# Patient Record
Sex: MALE | Race: ASIAN | NOT HISPANIC OR LATINO | Employment: FULL TIME | URBAN - METROPOLITAN AREA
[De-identification: names, ages, dates, MRNs, and addresses within clinical notes are randomized per-mention and may not be internally consistent; named-entity substitution may affect disease eponyms.]

---

## 2024-03-22 ENCOUNTER — HOSPITAL ENCOUNTER (EMERGENCY)
Facility: MEDICAL CENTER | Age: 44
End: 2024-03-22
Attending: EMERGENCY MEDICINE
Payer: COMMERCIAL

## 2024-03-22 VITALS
OXYGEN SATURATION: 95 % | WEIGHT: 196.87 LBS | SYSTOLIC BLOOD PRESSURE: 129 MMHG | RESPIRATION RATE: 18 BRPM | HEIGHT: 68 IN | TEMPERATURE: 97.8 F | DIASTOLIC BLOOD PRESSURE: 87 MMHG | BODY MASS INDEX: 29.84 KG/M2 | HEART RATE: 68 BPM

## 2024-03-22 DIAGNOSIS — R22.0 FACIAL SWELLING: ICD-10-CM

## 2024-03-22 DIAGNOSIS — T78.40XA ALLERGIC REACTION, INITIAL ENCOUNTER: ICD-10-CM

## 2024-03-22 PROCEDURE — 99284 EMERGENCY DEPT VISIT MOD MDM: CPT

## 2024-03-22 PROCEDURE — 700111 HCHG RX REV CODE 636 W/ 250 OVERRIDE (IP): Mod: JZ | Performed by: EMERGENCY MEDICINE

## 2024-03-22 PROCEDURE — 96375 TX/PRO/DX INJ NEW DRUG ADDON: CPT

## 2024-03-22 PROCEDURE — 96374 THER/PROPH/DIAG INJ IV PUSH: CPT

## 2024-03-22 RX ORDER — DIPHENHYDRAMINE HYDROCHLORIDE 50 MG/ML
25 INJECTION INTRAMUSCULAR; INTRAVENOUS ONCE
Status: COMPLETED | OUTPATIENT
Start: 2024-03-22 | End: 2024-03-22

## 2024-03-22 RX ORDER — PREDNISONE 20 MG/1
20 TABLET ORAL DAILY
Qty: 5 TABLET | Refills: 0 | Status: SHIPPED | OUTPATIENT
Start: 2024-03-22 | End: 2024-03-27

## 2024-03-22 RX ORDER — DEXAMETHASONE SODIUM PHOSPHATE 4 MG/ML
8 INJECTION, SOLUTION INTRA-ARTICULAR; INTRALESIONAL; INTRAMUSCULAR; INTRAVENOUS; SOFT TISSUE ONCE
Status: COMPLETED | OUTPATIENT
Start: 2024-03-22 | End: 2024-03-22

## 2024-03-22 RX ADMIN — DEXAMETHASONE SODIUM PHOSPHATE 8 MG: 4 INJECTION INTRA-ARTICULAR; INTRALESIONAL; INTRAMUSCULAR; INTRAVENOUS; SOFT TISSUE at 03:02

## 2024-03-22 RX ADMIN — DIPHENHYDRAMINE HYDROCHLORIDE 25 MG: 50 INJECTION, SOLUTION INTRAMUSCULAR; INTRAVENOUS at 03:03

## 2024-03-22 RX ADMIN — FAMOTIDINE 20 MG: 10 INJECTION, SOLUTION INTRAVENOUS at 03:03

## 2024-03-22 NOTE — ED NOTES
Assumed care of pt, received bedside report from FANTASMA Worrell   Cardiac and spO2 monitor on, call light within reach.

## 2024-03-22 NOTE — ED NOTES
Patient bedside report given to FANTASMA Guevara . Pt AAO X 4 , respirations even and unlabored, on room air . Call light in reach, Oxygen safety measures in place and Fall risk interventions in place.

## 2024-03-22 NOTE — ED TRIAGE NOTES
"Chief Complaint   Patient presents with    Facial Swelling     Facial swelling, primarily R side started around 2000 last night after dinner, and has progressively worsened. Unk etiology, pt denies known allergies      BP (!) 152/104   Pulse 82   Temp 36 °C (96.8 °F)   Resp 18   Ht 1.727 m (5' 8\")   Wt 89.3 kg (196 lb 13.9 oz)   SpO2 98%   BMI 29.93 kg/m²     Pt here for above cc  Pt reports eating dinner  last night and started to feel tingling/swelling sensation on R side of face. R side of face, lip, and mouth began to swell and has progressively worsened  Denies known allergies, ate salmon, zucchini, eggplant, potatoes, puttanesca (italian sauce w/ anchovies, shantal, herbs) sauce- last night for dinner    Fairly new meds for gout and L hip osteoporosis- motrin, alendronate sodium, and colchicine     Pt has noted swelling to R side of face, lips, and mouth. -airway compromise or respiratory issues/trouble breathing.  Denies tooth issues  Pt has never been here before- visiting from out of town    Pt to [facility]bara, educated on rooming process       "

## 2024-03-22 NOTE — ED PROVIDER NOTES
"  ER Provider Note    Scribed for Kale Hernández Ii, M.d. by Josué Aleman. 3/22/2024  2:46 AM    Primary Care Provider: No primary care provider noted.    CHIEF COMPLAINT  Chief Complaint   Patient presents with    Facial Swelling     Facial swelling, primarily R side started around 2000 last night after dinner, and has progressively worsened. Unk etiology, pt denies known allergies      EXTERNAL RECORDS REVIEWED  Other None available for review.     HPI/ROS  LIMITATION TO HISTORY   Select: : None  OUTSIDE HISTORIAN(S):  None    Harshad Chester is a 43 y.o. male who presents to the ED for evaluation of facial swelling onset 2000 last night after dinner. He states he felt something tingling under his lip after dinner before going to bed. 12:30 AM started on the lower right lip progressing to the upper lip and across the lower lip. He reports he is from [place]. The patient reports no associated symptoms, but denies any pain, vomiting, diarrhea, difficulty breathing.  He reports taking colchicine and ibuprofen since Tuesday for gout which he states have resolved his symptoms. His last dose of ibuprofen was around 8 PM. He has been taking alendronate for osteoporosis for the last 2 weeks. He denies having any similar episodes of similar symptoms previously.     PAST MEDICAL HISTORY  Past Medical History:   Diagnosis Date    Gout     Osteoporosis     L Hip, takes alendronate sodium     SURGICAL HISTORY  History reviewed. No pertinent surgical history.    FAMILY HISTORY  History reviewed. No pertinent family history.    SOCIAL HISTORY   reports that he has never smoked. He has never used smokeless tobacco. He reports that he does not currently use alcohol. He reports that he does not use drugs.    CURRENT MEDICATIONS  Previous Medications    No medications on file     ALLERGIES  Patient has no known allergies.    PHYSICAL EXAM  BP (!) 152/104   Pulse 82   Temp 36 °C (96.8 °F)   Resp 18   Ht 1.727 m (5' 8\")  "  Wt 89.3 kg (196 lb 13.9 oz)   SpO2 98%   BMI 29.93 kg/m²   Physical Exam  Vitals and nursing note reviewed.   HENT:      Head: Normocephalic.      Nose: Nose normal. No congestion.      Mouth/Throat:      Mouth: Mucous membranes are moist.      Comments: Right sided facial swelling at cheeks, no tenderness, no rash. Swelling also at right upper lip and entire lower lip. Voice clear. No stridor. No tongue or posterior pharynx edema.   Eyes:      Extraocular Movements: Extraocular movements intact.      Pupils: Pupils are equal, round, and reactive to light.   Cardiovascular:      Rate and Rhythm: Normal rate and regular rhythm.   Pulmonary:      Effort: Pulmonary effort is normal.      Breath sounds: Normal breath sounds. No wheezing.   Abdominal:      General: There is no distension.      Tenderness: There is no abdominal tenderness.   Musculoskeletal:         General: Normal range of motion.   Skin:     General: Skin is warm.      Findings: No rash.   Neurological:      General: No focal deficit present.      Mental Status: He is alert.      COURSE & MEDICAL DECISION MAKING     ED Observation Status? No; Patient does not meet criteria for ED Observation.     INITIAL ASSESSMENT, COURSE AND PLAN  Care Narrative:   2:49 AM - Patient was seen and evaluated at bedside. Patient presents to the ED for progressively worsening facial swelling onset last night around 8 PM.  No airway compromise. Lungs clear. No GI symptoms. Not anaphylaxis (only 1 system involved). After my exam, I discussed with the patient the plan of care, which includes treating the patient with antihistamines and steroids for their symptoms and reevaluation following medication administration. Patient understands and verbalizes agreement to plan of care. Patient will be treated with Benadryl 25 mg IV, dexamethasone 8 mg injection, and Pepcid 20 mg injection. Likely allergic reaction. Unlikely infection since not painful, no dental abnormalities.  Could also be other form of angioedema such as hereditary or autoimmune angioedema (but less likely since no prior history of this).     4:22 AM - Patient was reevaluated at bedside. Significant improvement in swelling observed. The patient reports feeling his swelling has improved. Plan to continue to monitor for improvement. If he continues to improve, I plan for discharge with prescriptions for antihistamine and steroid medication.     5:48 AM  Doing well and continues to improve. Will discharge now. In addition to prednisone, I have also prescribed an epi-pen for him to use as needed. I have asked him to follow up with primary provider when he arrives back home in [place].    PROBLEM LIST  #facial swelling   -likely secondary to allergic reaction but source unknown   -continue prednisone, benadryl, and pepcid as directed in discharge instructions    DISPOSITION AND DISCUSSIONS  I have discussed management of the patient with the following physicians and ONEIDA's:  None    Discussion of management with other Q or appropriate source(s): None     Escalation of care considered, and ultimately not performed: acute inpatient care management, however at this time, the patient is most appropriate for outpatient management.    Barriers to care at this time, including but not limited to:  None known at this time .     Decision tools and prescription drugs considered including, but not limited to:  steroids, when to use epi-pen .    The patient will return for new or worsening symptoms and is stable at the time of discharge.      DISPOSITION:  Patient will be discharged home in stable condition.    FOLLOW UP:  No follow-up provider specified.    OUTPATIENT MEDICATIONS:  New Prescriptions    EPINEPHRINE 0.3 MG/0.3ML SOLUTION PREFILLED SYRINGE    If you develop sudden facial swelling, trouble breathing, inject the contents of the epipen into the thigh, hold for 3 seconds and release from thigh as needed for  anaphylaxis. Go to ER after using    PREDNISONE (DELTASONE) 20 MG TAB    Take 1 Tablet by mouth every day for 5 days.      FINAL DIANGOSIS  1. Facial swelling    2. Allergic reaction, initial encounter         Josué QUICK (Joelibe), am scribing for, and in the presence of, AMERICA Campos II.    Electronically signed by: Josué Aleman (Scribe), 3/22/2024    Kale QUICK II, M* personally performed the services described in this documentation, as scribed by Josué Aleman in my presence, and it is both accurate and complete.     The note accurately reflects work and decisions made by me.  Kale Hernández II, M.D.  3/22/2024  5:50 AM

## 2024-03-22 NOTE — ED NOTES
Patient resting in bed, respirations even and unlabored on room air. Will continue to monitor patient.

## 2024-03-22 NOTE — DISCHARGE INSTRUCTIONS
In addition to prescribed Prednisone please take the following over the counter medications:    -Benadryl 25mg every 6-8 hours for next 3 days  -Pepcid 20mg twice daily for next 3 days

## 2024-03-22 NOTE — ED NOTES
Pt discharged home, discharge and follow up care instructions given, paper prescriptions given to pt, pt verbalized understanding. IV removed, pt ambulated out of ED independently.

## 2024-11-03 ENCOUNTER — HOSPITAL ENCOUNTER (EMERGENCY)
Facility: MEDICAL CENTER | Age: 44
End: 2024-11-03
Attending: EMERGENCY MEDICINE
Payer: COMMERCIAL

## 2024-11-03 VITALS
TEMPERATURE: 98 F | BODY MASS INDEX: 29.57 KG/M2 | HEART RATE: 87 BPM | SYSTOLIC BLOOD PRESSURE: 157 MMHG | DIASTOLIC BLOOD PRESSURE: 102 MMHG | RESPIRATION RATE: 16 BRPM | HEIGHT: 68 IN | OXYGEN SATURATION: 97 % | WEIGHT: 195.11 LBS

## 2024-11-03 DIAGNOSIS — N48.29 FORESKIN INFLAMMATION: ICD-10-CM

## 2024-11-03 DIAGNOSIS — Z20.2 POSSIBLE EXPOSURE TO STD: ICD-10-CM

## 2024-11-03 PROCEDURE — 87491 CHLMYD TRACH DNA AMP PROBE: CPT

## 2024-11-03 PROCEDURE — 87389 HIV-1 AG W/HIV-1&-2 AB AG IA: CPT

## 2024-11-03 PROCEDURE — 99283 EMERGENCY DEPT VISIT LOW MDM: CPT

## 2024-11-03 PROCEDURE — 36415 COLL VENOUS BLD VENIPUNCTURE: CPT

## 2024-11-03 PROCEDURE — 87591 N.GONORRHOEAE DNA AMP PROB: CPT

## 2024-11-03 PROCEDURE — 86780 TREPONEMA PALLIDUM: CPT

## 2024-11-03 ASSESSMENT — PAIN DESCRIPTION - PAIN TYPE: TYPE: ACUTE PAIN

## 2024-11-04 LAB
C TRACH DNA SPEC QL NAA+PROBE: NEGATIVE
HIV 1+2 AB+HIV1 P24 AG SERPL QL IA: NORMAL
N GONORRHOEA DNA SPEC QL NAA+PROBE: NEGATIVE
SPECIMEN SOURCE: NORMAL
T PALLIDUM AB SER QL IA: NONREACTIVE

## 2024-11-04 NOTE — ED NOTES
Patient given discharge instructions, verbalizes understanding. Patient provided education to come to ER if symptoms worsen. Discharged in stable condition with wife, able to walk out with steady gait.

## 2024-11-04 NOTE — ED TRIAGE NOTES
"Chief Complaint   Patient presents with    Wound Check     Reports \"a cut on my foreskin\". Unsure how he got this cut. Denies current antibx but expresses concern for infection d/t redness. Denies known fevers.      Physical Exam  Pulmonary:      Effort: Pulmonary effort is normal.   Skin:     General: Skin is warm and dry.   Neurological:      Mental Status: He is alert.       BP (!) 176/112   Pulse 100   Temp 36.7 °C (98 °F) (Temporal)   Resp 18   Ht 1.727 m (5' 8\")   Wt 88.5 kg (195 lb 1.7 oz)   SpO2 93%   BMI 29.67 kg/m²     "

## 2024-11-04 NOTE — ED PROVIDER NOTES
"ED Provider Note    CHIEF COMPLAINT  Chief Complaint   Patient presents with    Wound Check     Reports \"a cut on my foreskin\". Unsure how he got this cut. Denies current antibx but expresses concern for infection d/t redness. Denies known fevers.        EXTERNAL RECORDS REVIEWED  Reviewed outpatient family medicine encounters    HPI/ROS  LIMITATION TO HISTORY   None  OUTSIDE HISTORIAN(S):  None    Harshad Chester is a 44 y.o. male who presents for evaluation of some irritation around his foreskin.  The patient does admit he was sexually active without protection recently.  He has some discomfort on the underside of his foreskin.  He is concerned about possible STDs.  He denies any rash to the palms or soles fevers chills dysuria or hematuria.  He is an otherwise healthy 44-year-old.  No report of any lesions around the base of the penis or painful rash.    PAST MEDICAL HISTORY   has a past medical history of Gout, Hypertension, and Osteoporosis.    SURGICAL HISTORY  patient denies any surgical history    FAMILY HISTORY  No family history on file.    SOCIAL HISTORY  Social History     Tobacco Use    Smoking status: Never    Smokeless tobacco: Never   Substance and Sexual Activity    Alcohol use: Yes     Comment: occ    Drug use: Never    Sexual activity: Not on file       CURRENT MEDICATIONS  Home Medications       Reviewed by Stephanie Menendez R.N. (Registered Nurse) on 11/03/24 at 1602  Med List Status: Not Addressed     Medication Last Dose Status   EPINEPHrine 0.3 MG/0.3ML Solution Prefilled Syringe  Active                    ALLERGIES  Allergies   Allergen Reactions    Other Food      \"Spice, red meat\".        PHYSICAL EXAM  VITAL SIGNS: BP (!) 157/102   Pulse 87   Temp 36.7 °C (98 °F) (Temporal)   Resp 16   Ht 1.727 m (5' 8\")   Wt 88.5 kg (195 lb 1.7 oz)   SpO2 97%   BMI 29.67 kg/m²    Pulse ox interpretation: I interpret this pulse ox as normal.  Constitutional: Alert and oriented x 3, no acute " distress  HEENT: Atraumatic normocephalic, pupils are equal round reactive to light extraocular movements are intact. The nares is clear, external ears are normal, mouth shows moist mucous membranes normal dentition for age  Neck: Supple, no JVD no tracheal deviation  Cardiovascular: Regular rate and rhythm no murmur rub or gallop 2+ pulses peripherally x4  Thorax & Lungs: No respiratory distress, no wheezes rales or rhonchi, No chest tenderness.   GI: Soft nontender nondistended positive bowel sounds, no peritoneal signs  Male genitourinary: Patient is uncircumcised.  I can retract the foreskin.  There is no evidence of any obvious chancre.  There is a small lesion on the underside of the penis that is not consistent with a primary chancre or vesicles to suggest herpes.  There is no evidence of phimosis or paraphimosis no lymphadenopathy  Skin: Warm dry no acute rash or lesion  Musculoskeletal: Moving all extremities with full range and 5 of 5 strength no acute  deformity  Neurologic: Cranial nerves III through XII are grossly intact no sensory deficit no cerebellar dysfunction   Psychiatric: Appropriate affect for situation at this time          EKG/LABS  HIV, syphilis and gonorrhea chlamydia are pending    COURSE & MEDICAL DECISION MAKING    ASSESSMENT, COURSE AND PLAN  Care Narrative:    This is a very pleasant 44-year-old gentleman who presents here with some foreskin irritation.  Further history taking reveals that with recent unprotected sex he is primarily concerned about possible STD testing.  He did not clinically have urethral discharge or any obvious chancre or vesicles to suggest urethritis, syphilis or HSV infection.  Out of an abundance of caution I did offer the patient STD testing including gonorrhea and chlamydia urine testing, HIV and syphilis testing.  Apparently these had to be sent downtown because of the lab does not run these on the weekend.  The patient already waited 2 hours and is eager to  go home.  I think that is reasonable.  I counseled him to follow-up on [facility] and if there are any abnormalities he can follow-up with his PCP or return here for additional testing and/or treatment          ADDITIONAL PROBLEMS MANAGED      DISPOSITION AND DISCUSSIONS  I have discussed management of the patient with the following physicians and ONEIDA's: None    Discussion of management with other QHP or appropriate source(s): None    Escalation of care considered, and ultimately not performed: None    Barriers to care at this time, including but not limited to: None.     Decision tools and prescription drugs considered including, but not limited to: None.    FINAL DIAGNOSIS  1. Foreskin inflammation    2. Possible exposure to STD         Electronically signed by: Kurtis Navarro M.D., 11/3/2024 7:46 PM

## 2024-11-05 ENCOUNTER — HOSPITAL ENCOUNTER (EMERGENCY)
Facility: MEDICAL CENTER | Age: 44
End: 2024-11-05
Attending: STUDENT IN AN ORGANIZED HEALTH CARE EDUCATION/TRAINING PROGRAM
Payer: COMMERCIAL

## 2024-11-05 VITALS
SYSTOLIC BLOOD PRESSURE: 160 MMHG | DIASTOLIC BLOOD PRESSURE: 115 MMHG | RESPIRATION RATE: 18 BRPM | TEMPERATURE: 98.4 F | HEART RATE: 87 BPM | BODY MASS INDEX: 29.6 KG/M2 | WEIGHT: 195.33 LBS | OXYGEN SATURATION: 93 % | HEIGHT: 68 IN

## 2024-11-05 DIAGNOSIS — N48.9 PENILE LESION: ICD-10-CM

## 2024-11-05 PROCEDURE — 99284 EMERGENCY DEPT VISIT MOD MDM: CPT

## 2024-11-05 PROCEDURE — 700102 HCHG RX REV CODE 250 W/ 637 OVERRIDE(OP): Performed by: STUDENT IN AN ORGANIZED HEALTH CARE EDUCATION/TRAINING PROGRAM

## 2024-11-05 PROCEDURE — A9270 NON-COVERED ITEM OR SERVICE: HCPCS | Performed by: STUDENT IN AN ORGANIZED HEALTH CARE EDUCATION/TRAINING PROGRAM

## 2024-11-05 PROCEDURE — 96372 THER/PROPH/DIAG INJ SC/IM: CPT

## 2024-11-05 PROCEDURE — 700111 HCHG RX REV CODE 636 W/ 250 OVERRIDE (IP): Mod: JZ | Performed by: STUDENT IN AN ORGANIZED HEALTH CARE EDUCATION/TRAINING PROGRAM

## 2024-11-05 RX ORDER — DOXYCYCLINE 100 MG/1
100 CAPSULE ORAL 2 TIMES DAILY
Qty: 14 CAPSULE | Refills: 0 | Status: ACTIVE | OUTPATIENT
Start: 2024-11-05 | End: 2024-11-12

## 2024-11-05 RX ORDER — DOXYCYCLINE 100 MG/1
100 TABLET ORAL ONCE
Status: COMPLETED | OUTPATIENT
Start: 2024-11-05 | End: 2024-11-05

## 2024-11-05 RX ADMIN — PENICILLIN G BENZATHINE 2.4 MILLION UNITS: 1200000 INJECTION, SUSPENSION INTRAMUSCULAR at 23:19

## 2024-11-05 RX ADMIN — DOXYCYCLINE 100 MG: 100 TABLET, FILM COATED ORAL at 23:19

## 2024-11-05 NOTE — DISCHARGE PLANNING
"Writer received a call from this Pt to follow up on his ED visit from this past Sunday. He stated that he thought the ED DR was going to refer him to a urologist but writer reviewed the chart and the notes, nothing about a referral was noted. Though Dr Navarro did note, \" I counseled him to follow-up on [facility] My[facility] and if there are any abnormalities he can follow-up with his PCP or return here for additional testing and/or treatment.\"    Writer advised Pt of this notation.  "

## 2024-11-06 NOTE — ED NOTES
Patient is stable for d/c at this time, anticipatory guidance provided, close follow-up encouraged, and ED return instructions have been detailed. Patient  agreeable to the disposition, and plan and discharged home in ambulatory state and good condition.    Rx education provided, pt verbalized understanding.

## 2024-11-06 NOTE — ED PROVIDER NOTES
CHIEF COMPLAINT  Chief Complaint   Patient presents with    Pelvic Pain     PT presents d/t sores on his foreskin x 5 days. PT seen here for same on Sunday, received negative STD test results, but continues to have painful sores on his foreskin.        LIMITATION TO HISTORY   Select: None    HPI    Harshad Chester is a 44 y.o. male who presents to the Emergency Department evaluation of painful sores on his penis and foreskin.  Patient stated he had unprotected sex recently afterwards he developed painful sores on his penis.  Did have a recent STD testing which was negative.    OUTSIDE HISTORIAN(S):  Select: None    EXTERNAL RECORDS REVIEWED  Select: Other reviewed RPR which was negative.      PAST MEDICAL HISTORY  Past Medical History:   Diagnosis Date    Gout     Hypertension     Osteoporosis     L Hip, takes alendronate sodium     .    SURGICAL HISTORY  History reviewed. No pertinent surgical history.      FAMILY HISTORY  History reviewed. No pertinent family history.       SOCIAL HISTORY  Social History     Socioeconomic History    Marital status:      Spouse name: Not on file    Number of children: Not on file    Years of education: Not on file    Highest education level: Not on file   Occupational History    Not on file   Tobacco Use    Smoking status: Never    Smokeless tobacco: Never   Substance and Sexual Activity    Alcohol use: Yes     Comment: occ    Drug use: Never    Sexual activity: Not on file   Other Topics Concern    Not on file   Social History Narrative    Not on file     Social Drivers of Health     Financial Resource Strain: Not on File (3/9/2021)    Received from [facility]    Financial Resource Strain     Financial Resource Strain: 0   Food Insecurity: Not on File (3/9/2021)    Received from [facility]    Food Insecurity     Food: 0   Transportation Needs: Not on File (3/9/2021)    Received from [facility]    Transportation Needs     Transportation: 0   Physical Activity: Not on  "File (3/9/2021)    Received from [facility]    Physical Activity     Physical Activity: 0   Stress: Not on File (3/9/2021)    Received from [facility]    Stress     Stress: 0   Social Connections: Not on File (3/9/2021)    Received from [facility]    Social Connections     Social Connections and Isolation: 0   Intimate Partner Violence: Not on file   Housing Stability: Not on File (3/9/2021)    Received from [facility]    Housing Stability     Housin         CURRENT MEDICATIONS  No current facility-administered medications on file prior to encounter.     Current Outpatient Medications on File Prior to Encounter   Medication Sig Dispense Refill    EPINEPHrine 0.3 MG/0.3ML Solution Prefilled Syringe If you develop sudden facial swelling, trouble breathing, inject the contents of the epipen into the thigh, hold for 3 seconds and release from thigh as needed for anaphylaxis. Go to ER after using 1 Each 0           ALLERGIES  Allergies   Allergen Reactions    Other Food      \"Spice, red meat\".        PHYSICAL EXAM  VITAL SIGNS:BP (!) 160/115   Pulse 87   Temp 36.9 °C (98.4 °F) (Temporal)   Resp 18   Ht 1.727 m (5' 8\")   Wt 88.6 kg (195 lb 5.2 oz)   SpO2 93%   BMI 29.70 kg/m²       VITALS - vital signs documented prior to this note have been reviewed and noted,  see EHR  GENERAL - awake and alert, no acute distress  HEENT - normocephalic, atraumatic, moist mucus membranes  CARDIOVASCULAR - regular rate and rhythm  PULMONARY - unlabored, no respiratory distress. No audible wheezing or  strido   :.  Uncircumcised male, after retracting the foreskin, does have 2 cankers lesions on the  shaft of the penis which are tender to palpation he has tender bilateral inguinal lymphadenopathy  NEUROLOGIC - mental status normal, speech fluid, cognition normal  MUSCULOSKELETAL -no obvious deformity or swelling  DERMATOLOGIC - warm and dry, no visible rashes  PSYCHIATRIC - normal affect, normal " concentration      DIAGNOSTIC STUDIES / PROCEDURES        Radiologist interpretation:   No orders to display        COURSE & MEDICAL DECISION MAKING    ED COURSE:    ED Observation Status? no    INTERVENTIONS BY ME:  Medications   penicillin G benzathine (Bicillin-LA) injection 2.4 Million Units (2.4 Million Units Intramuscular Given 11/5/24 2319)   doxycycline monohydrate (Adoxa) tablet 100 mg (100 mg Oral Given 11/5/24 2319)                   INITIAL ASSESSMENT, COURSE AND PLAN  Care Narrative: Patient presented for evaluation of painful penile lesions.  On examination he has painful cankers lesions on the shaft of his penis as well as a tender anterior lymphadenopathy.  Was seen a few days ago for similar complaint and there was no noted lesions on his penis at that time his STD testing was also negative.  Perhaps this represents an early syphilis given that he had no noted chancroid's initially and his RPR was negative as such will treat with penicillin.  Also possible this represents an atypical sexually transmitted infection such as h ducreyi thus will elect to cover the patient with oral doxycycline as well.  He will be instructed to have his partner tested and return with worsening or persistent symptoms.  Patient felt comfortable with plan was discharged in stable condition.             ADDITIONAL PROBLEM LIST    DISPOSITION AND DISCUSSIONS    Barriers to care at this time, including but not limited to: Patient does not have established PCP.     Decision tools and prescription drugs considered including, but not limited to: Antibiotics patient will be discharged on doxycycline .    FINAL DIAGNOSIS  1. Penile lesion             Electronically signed by: Bala Marx DO ,11:34 PM 11/05/24

## 2024-11-06 NOTE — ED TRIAGE NOTES
"Chief Complaint   Patient presents with    Pelvic Pain     PT presents d/t sores on his foreskin x 5 days. PT seen here for same on Sunday, received negative STD test results, but continues to have painful sores on his foreskin.      BP (!) 160/115   Pulse 87   Temp 36.9 °C (98.4 °F) (Temporal)   Resp 18   Ht 1.727 m (5' 8\")   Wt 88.6 kg (195 lb 5.2 oz)   SpO2 93%   BMI 29.70 kg/m²     "

## 2024-11-07 ENCOUNTER — APPOINTMENT (OUTPATIENT)
Dept: MEDICAL GROUP | Age: 44
End: 2024-11-07
Payer: COMMERCIAL

## 2025-03-18 SDOH — HEALTH STABILITY: PHYSICAL HEALTH: ON AVERAGE, HOW MANY DAYS PER WEEK DO YOU ENGAGE IN MODERATE TO STRENUOUS EXERCISE (LIKE A BRISK WALK)?: 7 DAYS

## 2025-03-18 SDOH — ECONOMIC STABILITY: FOOD INSECURITY: WITHIN THE PAST 12 MONTHS, THE FOOD YOU BOUGHT JUST DIDN'T LAST AND YOU DIDN'T HAVE MONEY TO GET MORE.: NEVER TRUE

## 2025-03-18 SDOH — ECONOMIC STABILITY: INCOME INSECURITY: HOW HARD IS IT FOR YOU TO PAY FOR THE VERY BASICS LIKE FOOD, HOUSING, MEDICAL CARE, AND HEATING?: NOT HARD AT ALL

## 2025-03-18 SDOH — ECONOMIC STABILITY: INCOME INSECURITY: IN THE LAST 12 MONTHS, WAS THERE A TIME WHEN YOU WERE NOT ABLE TO PAY THE MORTGAGE OR RENT ON TIME?: NO

## 2025-03-18 SDOH — HEALTH STABILITY: PHYSICAL HEALTH: ON AVERAGE, HOW MANY MINUTES DO YOU ENGAGE IN EXERCISE AT THIS LEVEL?: 60 MIN

## 2025-03-18 SDOH — HEALTH STABILITY: MENTAL HEALTH
STRESS IS WHEN SOMEONE FEELS TENSE, NERVOUS, ANXIOUS, OR CAN'T SLEEP AT NIGHT BECAUSE THEIR MIND IS TROUBLED. HOW STRESSED ARE YOU?: NOT AT ALL

## 2025-03-18 SDOH — ECONOMIC STABILITY: TRANSPORTATION INSECURITY
IN THE PAST 12 MONTHS, HAS LACK OF RELIABLE TRANSPORTATION KEPT YOU FROM MEDICAL APPOINTMENTS, MEETINGS, WORK OR FROM GETTING THINGS NEEDED FOR DAILY LIVING?: NO

## 2025-03-18 SDOH — ECONOMIC STABILITY: FOOD INSECURITY: WITHIN THE PAST 12 MONTHS, YOU WORRIED THAT YOUR FOOD WOULD RUN OUT BEFORE YOU GOT MONEY TO BUY MORE.: NEVER TRUE

## 2025-03-18 SDOH — ECONOMIC STABILITY: TRANSPORTATION INSECURITY
IN THE PAST 12 MONTHS, HAS THE LACK OF TRANSPORTATION KEPT YOU FROM MEDICAL APPOINTMENTS OR FROM GETTING MEDICATIONS?: NO

## 2025-03-18 SDOH — ECONOMIC STABILITY: TRANSPORTATION INSECURITY
IN THE PAST 12 MONTHS, HAS LACK OF TRANSPORTATION KEPT YOU FROM MEETINGS, WORK, OR FROM GETTING THINGS NEEDED FOR DAILY LIVING?: NO

## 2025-03-18 SDOH — ECONOMIC STABILITY: HOUSING INSECURITY
IN THE LAST 12 MONTHS, WAS THERE A TIME WHEN YOU DID NOT HAVE A STEADY PLACE TO SLEEP OR SLEPT IN A SHELTER (INCLUDING NOW)?: NO

## 2025-03-18 ASSESSMENT — SOCIAL DETERMINANTS OF HEALTH (SDOH)
HOW OFTEN DO YOU GET TOGETHER WITH FRIENDS OR RELATIVES?: ONCE A WEEK
WITHIN THE PAST 12 MONTHS, YOU WORRIED THAT YOUR FOOD WOULD RUN OUT BEFORE YOU GOT THE MONEY TO BUY MORE: NEVER TRUE
HOW OFTEN DO YOU HAVE A DRINK CONTAINING ALCOHOL: 2-4 TIMES A MONTH
IN THE PAST 12 MONTHS, HAS THE ELECTRIC, GAS, OIL, OR WATER COMPANY THREATENED TO SHUT OFF SERVICE IN YOUR HOME?: NO
DO YOU BELONG TO ANY CLUBS OR ORGANIZATIONS SUCH AS CHURCH GROUPS UNIONS, FRATERNAL OR ATHLETIC GROUPS, OR SCHOOL GROUPS?: NO
IN A TYPICAL WEEK, HOW MANY TIMES DO YOU TALK ON THE PHONE WITH FAMILY, FRIENDS, OR NEIGHBORS?: MORE THAN THREE TIMES A WEEK
DO YOU BELONG TO ANY CLUBS OR ORGANIZATIONS SUCH AS CHURCH GROUPS UNIONS, FRATERNAL OR ATHLETIC GROUPS, OR SCHOOL GROUPS?: NO
HOW HARD IS IT FOR YOU TO PAY FOR THE VERY BASICS LIKE FOOD, HOUSING, MEDICAL CARE, AND HEATING?: NOT HARD AT ALL
HOW OFTEN DO YOU ATTENT MEETINGS OF THE CLUB OR ORGANIZATION YOU BELONG TO?: MORE THAN 4 TIMES PER YEAR
HOW OFTEN DO YOU GET TOGETHER WITH FRIENDS OR RELATIVES?: ONCE A WEEK
HOW OFTEN DO YOU ATTEND CHURCH OR RELIGIOUS SERVICES?: 1 TO 4 TIMES PER YEAR
IN A TYPICAL WEEK, HOW MANY TIMES DO YOU TALK ON THE PHONE WITH FAMILY, FRIENDS, OR NEIGHBORS?: MORE THAN THREE TIMES A WEEK
HOW OFTEN DO YOU HAVE SIX OR MORE DRINKS ON ONE OCCASION: NEVER
HOW OFTEN DO YOU ATTEND CHURCH OR RELIGIOUS SERVICES?: 1 TO 4 TIMES PER YEAR
HOW OFTEN DO YOU ATTENT MEETINGS OF THE CLUB OR ORGANIZATION YOU BELONG TO?: MORE THAN 4 TIMES PER YEAR
HOW MANY DRINKS CONTAINING ALCOHOL DO YOU HAVE ON A TYPICAL DAY WHEN YOU ARE DRINKING: 1 OR 2

## 2025-03-18 ASSESSMENT — LIFESTYLE VARIABLES
SKIP TO QUESTIONS 9-10: 1
HOW OFTEN DO YOU HAVE A DRINK CONTAINING ALCOHOL: 2-4 TIMES A MONTH
AUDIT-C TOTAL SCORE: 2
HOW OFTEN DO YOU HAVE SIX OR MORE DRINKS ON ONE OCCASION: NEVER
HOW MANY STANDARD DRINKS CONTAINING ALCOHOL DO YOU HAVE ON A TYPICAL DAY: 1 OR 2

## 2025-03-19 ENCOUNTER — APPOINTMENT (OUTPATIENT)
Dept: MEDICAL GROUP | Facility: PHYSICIAN GROUP | Age: 45
End: 2025-03-19
Payer: COMMERCIAL

## 2025-03-19 VITALS
WEIGHT: 198 LBS | HEART RATE: 86 BPM | TEMPERATURE: 98.1 F | SYSTOLIC BLOOD PRESSURE: 148 MMHG | BODY MASS INDEX: 30.01 KG/M2 | DIASTOLIC BLOOD PRESSURE: 74 MMHG | RESPIRATION RATE: 14 BRPM | OXYGEN SATURATION: 96 % | HEIGHT: 68 IN

## 2025-03-19 DIAGNOSIS — G89.29 CHRONIC RIGHT SHOULDER PAIN: ICD-10-CM

## 2025-03-19 DIAGNOSIS — M25.511 CHRONIC RIGHT SHOULDER PAIN: ICD-10-CM

## 2025-03-19 DIAGNOSIS — Z12.11 SCREENING FOR COLON CANCER: ICD-10-CM

## 2025-03-19 DIAGNOSIS — I10 PRIMARY HYPERTENSION: ICD-10-CM

## 2025-03-19 DIAGNOSIS — M10.9 ACUTE GOUT OF FOOT, UNSPECIFIED CAUSE, UNSPECIFIED LATERALITY: ICD-10-CM

## 2025-03-19 DIAGNOSIS — Z00.00 PHYSICAL EXAM, ANNUAL: ICD-10-CM

## 2025-03-19 DIAGNOSIS — T78.3XXD ANGIOEDEMA, SUBSEQUENT ENCOUNTER: ICD-10-CM

## 2025-03-19 DIAGNOSIS — Z11.3 SCREEN FOR STD (SEXUALLY TRANSMITTED DISEASE): ICD-10-CM

## 2025-03-19 PROCEDURE — 3077F SYST BP >= 140 MM HG: CPT | Performed by: PHYSICIAN ASSISTANT

## 2025-03-19 PROCEDURE — 3078F DIAST BP <80 MM HG: CPT | Performed by: PHYSICIAN ASSISTANT

## 2025-03-19 PROCEDURE — 99204 OFFICE O/P NEW MOD 45 MIN: CPT | Performed by: PHYSICIAN ASSISTANT

## 2025-03-19 RX ORDER — COLCHICINE 0.6 MG/1
TABLET ORAL
Qty: 3 TABLET | Refills: 3 | Status: SHIPPED | OUTPATIENT
Start: 2025-03-19

## 2025-03-19 RX ORDER — LOSARTAN POTASSIUM 25 MG/1
25 TABLET ORAL DAILY
Qty: 100 TABLET | Refills: 3 | Status: SHIPPED | OUTPATIENT
Start: 2025-03-19 | End: 2026-04-23

## 2025-03-19 ASSESSMENT — PATIENT HEALTH QUESTIONNAIRE - PHQ9: CLINICAL INTERPRETATION OF PHQ2 SCORE: 0

## 2025-03-19 NOTE — PROGRESS NOTES
CC:    Chief Complaint   Patient presents with    [facility]     Gout, hypertension concerns        HISTORY OF THE PRESENT ILLNESS: Patient is a 44 y.o. male presenting to establish primary care     Pt concerned about his BP. Has had high BP readings in the past.   Has hx of gout. Had 4 flares in two years. Most recent was about a year ago and since then has been modifying his diet.   Pt has hx of L hip pain. Diagnosed by ortho with osteoporosis and treated with Fosamax. He went non weight bearing for several months.   Pt has hx of lip swelling from allergic reaction. Seen about a year ago in ER for this and prescribed epipen. Has happened several times since then but has not been able to identify trigger.   Pt having R foot pain for past year. Worse in morning when he gets up.   Pt has keloid over his chest from ingrown hair removal.     No problem-specific Assessment & Plan notes found for this encounter.    Allergies: Other food    Current Outpatient Medications Ordered in Epic   Medication Sig Dispense Refill    EPINEPHrine 0.3 MG/0.3ML Solution Prefilled Syringe If you develop sudden facial swelling, trouble breathing, inject the contents of the epipen into the thigh, hold for 3 seconds and release from thigh as needed for anaphylaxis. Go to ER after using 1 Each 0     No current [facility]-ordered facility-administered medications on file.       Past Medical History:   Diagnosis Date    Gout     Hypertension     Osteoporosis     L Hip, takes alendronate sodium       No past surgical history on file.    Social History     Tobacco Use    Smoking status: Never    Smokeless tobacco: Never   Vaping Use    Vaping status: Never Used   Substance Use Topics    Alcohol use: Yes     Comment: occ    Drug use: Never       Social History     Social History Narrative    Not on file       No family history on file.    ROS:     - Constitutional: Negative for fever, chills, unexpected weight change, and fatigue/generalized  "weakness.        .      Exam: BP (!) 148/74   Pulse 86   Temp 36.7 °C (98.1 °F) (Temporal)   Resp 14   Ht 1.727 m (5' 8\")   Wt 89.8 kg (198 lb)   SpO2 96%  Body mass index is 30.11 kg/m².    General: Normal appearing. No acute distress.  Skin: Warm and dry.  Positive for keloid scar over chest  HEENT: Normocephalic. Eyes conjunctiva clear lids without ptosis, ears normal shape and contour  Cardiovascular: Regular rate and rhythm without murmur.   Respiratory: Clear to auscultation bilaterally, no rhonchi wheezing or rales.  Neurologic: Grossly nonfocal, A&O x3, gait normal,  Musculoskeletal: No deformity or swelling.   Extremities: No extremity cyanosis, clubbing, or edema.  Psych: Normal mood and affect. Judgment and insight is normal.    Please note that this dictation was created using voice recognition software. I have made every reasonable attempt to correct obvious errors, but I expect that there are errors of grammar and possibly content that I did not discover before finalizing the note.      Assessment/Plan    1. Acute gout of foot, unspecified cause, unspecified laterality  Take colchicine PRN for gout flares.   - colchicine (COLCRYS) 0.6 MG Tab; Take two tablets by mouth as needed for gout flare, then take one tablet one hour later  Dispense: 3 Tablet; Refill: 3    2. Angioedema, subsequent encounter  Referral placed.   - Referral to Allergy    3. Screening for colon cancer    - Referral to GI for Colonoscopy    4. Physical exam, annual  Labs printed  - CBC WITH DIFFERENTIAL; Future  - HEMOGLOBIN A1C; Future  - Comp Metabolic Panel; Future  - Lipid Profile; Future  - TSH WITH REFLEX TO FT4; Future  - VITAMIN D,25 HYDROXY (DEFICIENCY); Future  - CT-CARDIAC SCORING; Future    5. Primary hypertension  Start on losartan 25mg  - losartan (COZAAR) 25 MG Tab; Take 1 Tablet by mouth every day.  Dispense: 100 Tablet; Refill: 3    6. Screen for STD (sexually transmitted disease)    - Chlamydia/GC, PCR " (Urine); Future  - HEP B CORE AB TEST,TOTAL; Future  - T.PALLIDUM AB FRANKY (SCREENING); Future  - HIV AG/AB COMBO ASSAY SCREENING; Future    7. Chronic right shoulder pain  Will get ortho consult  - Referral to Orthopedics

## 2025-03-20 ENCOUNTER — HOSPITAL ENCOUNTER (OUTPATIENT)
Facility: MEDICAL CENTER | Age: 45
End: 2025-03-20
Attending: PHYSICIAN ASSISTANT
Payer: COMMERCIAL

## 2025-03-20 DIAGNOSIS — Z00.00 PHYSICAL EXAM, ANNUAL: ICD-10-CM

## 2025-03-20 DIAGNOSIS — Z11.3 SCREEN FOR STD (SEXUALLY TRANSMITTED DISEASE): ICD-10-CM

## 2025-03-20 LAB
25(OH)D3 SERPL-MCNC: 26 NG/ML (ref 30–100)
ALBUMIN SERPL BCP-MCNC: 4.1 G/DL (ref 3.2–4.9)
ALBUMIN/GLOB SERPL: 1.2 G/DL
ALP SERPL-CCNC: 118 U/L (ref 30–99)
ALT SERPL-CCNC: 36 U/L (ref 2–50)
ANION GAP SERPL CALC-SCNC: 11 MMOL/L (ref 7–16)
AST SERPL-CCNC: 38 U/L (ref 12–45)
BASOPHILS # BLD AUTO: 0.3 % (ref 0–1.8)
BASOPHILS # BLD: 0.03 K/UL (ref 0–0.12)
BILIRUB SERPL-MCNC: 0.5 MG/DL (ref 0.1–1.5)
BUN SERPL-MCNC: 16 MG/DL (ref 8–22)
CALCIUM ALBUM COR SERPL-MCNC: 9.2 MG/DL (ref 8.5–10.5)
CALCIUM SERPL-MCNC: 9.3 MG/DL (ref 8.4–10.2)
CHLORIDE SERPL-SCNC: 108 MMOL/L (ref 96–112)
CHOLEST SERPL-MCNC: 163 MG/DL (ref 100–199)
CO2 SERPL-SCNC: 22 MMOL/L (ref 20–33)
CREAT SERPL-MCNC: 1.09 MG/DL (ref 0.5–1.4)
EOSINOPHIL # BLD AUTO: 0.36 K/UL (ref 0–0.51)
EOSINOPHIL NFR BLD: 3.2 % (ref 0–6.9)
ERYTHROCYTE [DISTWIDTH] IN BLOOD BY AUTOMATED COUNT: 46.5 FL (ref 35.9–50)
EST. AVERAGE GLUCOSE BLD GHB EST-MCNC: 126 MG/DL
FASTING STATUS PATIENT QL REPORTED: NORMAL
GFR SERPLBLD CREATININE-BSD FMLA CKD-EPI: 85 ML/MIN/1.73 M 2
GLOBULIN SER CALC-MCNC: 3.3 G/DL (ref 1.9–3.5)
GLUCOSE SERPL-MCNC: 100 MG/DL (ref 65–99)
HBA1C MFR BLD: 6 % (ref 4–5.6)
HBV CORE AB SERPL QL IA: NONREACTIVE
HCT VFR BLD AUTO: 45.7 % (ref 42–52)
HDLC SERPL-MCNC: 52 MG/DL
HGB BLD-MCNC: 14.8 G/DL (ref 14–18)
HIV 1+2 AB+HIV1 P24 AG SERPL QL IA: NORMAL
IMM GRANULOCYTES # BLD AUTO: 0.04 K/UL (ref 0–0.11)
IMM GRANULOCYTES NFR BLD AUTO: 0.4 % (ref 0–0.9)
LDLC SERPL CALC-MCNC: 101 MG/DL
LYMPHOCYTES # BLD AUTO: 2.39 K/UL (ref 1–4.8)
LYMPHOCYTES NFR BLD: 21.5 % (ref 22–41)
MCH RBC QN AUTO: 29.1 PG (ref 27–33)
MCHC RBC AUTO-ENTMCNC: 32.4 G/DL (ref 32.3–36.5)
MCV RBC AUTO: 89.8 FL (ref 81.4–97.8)
MONOCYTES # BLD AUTO: 0.76 K/UL (ref 0–0.85)
MONOCYTES NFR BLD AUTO: 6.8 % (ref 0–13.4)
NEUTROPHILS # BLD AUTO: 7.55 K/UL (ref 1.82–7.42)
NEUTROPHILS NFR BLD: 67.8 % (ref 44–72)
NRBC # BLD AUTO: 0 K/UL
NRBC BLD-RTO: 0 /100 WBC (ref 0–0.2)
PLATELET # BLD AUTO: 183 K/UL (ref 164–446)
PMV BLD AUTO: 12.3 FL (ref 9–12.9)
POTASSIUM SERPL-SCNC: 4.2 MMOL/L (ref 3.6–5.5)
PROT SERPL-MCNC: 7.4 G/DL (ref 6–8.2)
RBC # BLD AUTO: 5.09 M/UL (ref 4.7–6.1)
SODIUM SERPL-SCNC: 141 MMOL/L (ref 135–145)
T PALLIDUM AB SER QL IA: NORMAL
TRIGL SERPL-MCNC: 51 MG/DL (ref 0–149)
TSH SERPL DL<=0.005 MIU/L-ACNC: 2.03 UIU/ML (ref 0.38–5.33)
WBC # BLD AUTO: 11.1 K/UL (ref 4.8–10.8)

## 2025-03-20 PROCEDURE — 87491 CHLMYD TRACH DNA AMP PROBE: CPT

## 2025-03-20 PROCEDURE — 80061 LIPID PANEL: CPT

## 2025-03-20 PROCEDURE — 84443 ASSAY THYROID STIM HORMONE: CPT

## 2025-03-20 PROCEDURE — 87591 N.GONORRHOEAE DNA AMP PROB: CPT

## 2025-03-20 PROCEDURE — 86704 HEP B CORE ANTIBODY TOTAL: CPT

## 2025-03-20 PROCEDURE — 36415 COLL VENOUS BLD VENIPUNCTURE: CPT

## 2025-03-20 PROCEDURE — 82306 VITAMIN D 25 HYDROXY: CPT

## 2025-03-20 PROCEDURE — 87389 HIV-1 AG W/HIV-1&-2 AB AG IA: CPT

## 2025-03-20 PROCEDURE — 86780 TREPONEMA PALLIDUM: CPT

## 2025-03-20 PROCEDURE — 80053 COMPREHEN METABOLIC PANEL: CPT

## 2025-03-20 PROCEDURE — 85025 COMPLETE CBC W/AUTO DIFF WBC: CPT

## 2025-03-20 PROCEDURE — 83036 HEMOGLOBIN GLYCOSYLATED A1C: CPT

## 2025-03-21 ENCOUNTER — HOSPITAL ENCOUNTER (OUTPATIENT)
Dept: RADIOLOGY | Facility: MEDICAL CENTER | Age: 45
End: 2025-03-21
Attending: PHYSICIAN ASSISTANT
Payer: COMMERCIAL

## 2025-03-21 DIAGNOSIS — Z00.00 PHYSICAL EXAM, ANNUAL: ICD-10-CM

## 2025-03-21 LAB
C TRACH DNA SPEC QL NAA+PROBE: NEGATIVE
N GONORRHOEA DNA SPEC QL NAA+PROBE: NEGATIVE
SPECIMEN SOURCE: NORMAL

## 2025-03-21 PROCEDURE — 4410556 CT-CARDIAC SCORING (SELF PAY ONLY)

## 2025-03-25 NOTE — Clinical Note
REFERRAL APPROVAL NOTICE         Sent on March 25, 2025                   Harshad Chester  [address] [place] [address]                   Dear Mr. Chester,    After a careful review of the medical information and benefit coverage, [facility] has processed your referral. See below for additional details.    If applicable, you must be actively enrolled with your insurance for coverage of the authorized service. If you have any questions regarding your coverage, please contact your insurance directly.    REFERRAL INFORMATION   Referral #:  [id number]  Referred-To Service Location    Referred-By Provider:  Gastroenterology    Max Emery P.A.-C.   [facility]      [address] [facility]  [address]  [place] [id number] [phone] [address] [facility] [address]  [phone]    Referral Start Date:  03/19/2025  Referral End Date:   03/19/2026             SCHEDULING  If you do not already have an appointment, please call [phone] to make an appointment.     MORE INFORMATION  If you do not already have a [employer] account, sign up at: [email or web address]  You can access your medical information, make appointments, see lab results, billing information, and more.  If you have questions regarding this referral, please contact  the [facility] Referrals department at:             [phone]. Monday - Friday 8:00AM - 5:00PM.     Sincerely,    [facility]

## 2025-03-25 NOTE — Clinical Note
REFERRAL APPROVAL NOTICE         Sent on March 25, 2025                   Harshad Chester  [address] [facility] [address]                   Dear Mr. Chester,    After a careful review of the medical information and benefit coverage, [facility] has processed your referral. See below for additional details.    If applicable, you must be actively enrolled with your insurance for coverage of the authorized service. If you have any questions regarding your coverage, please contact your insurance directly.    REFERRAL INFORMATION   Referral #:  [id number]  Referred-To Department    Referred-By Provider:  Orthopedics    Max Emery P.A.-C.   [facility] ([facility])      [address] [id number] [phone] [address] [facility] [id number] [phone]    Referral Start Date:  03/19/2025  Referral End Date:   03/19/2026             SCHEDULING  If you do not already have an appointment, please call [phone] to make an appointment.     MORE INFORMATION  If you do not already have a [employer] account, sign up at: [email or web address]  You can access your medical information, make appointments, see lab results, billing information, and more.  If you have questions regarding this referral, please contact  the [facility] Referrals department at:             [phone]. Monday - Friday 8:00AM - 5:00PM.     Sincerely,    [facility]

## 2025-03-25 NOTE — Clinical Note
REFERRAL APPROVAL NOTICE         Sent on March 25, 2025                   Harshad Chester  [address]  [place] [address]                   Dear Mr. Chester,    After a careful review of the medical information and benefit coverage, [facility] has processed your referral. See below for additional details.    If applicable, you must be actively enrolled with your insurance for coverage of the authorized service. If you have any questions regarding your coverage, please contact your insurance directly.    REFERRAL INFORMATION   Referral #:  [id number]  Referred-To Service Location    Referred-By Provider:  Allergy and Immunology    Max Emery P.A.-C.   [facility]      [address] [facility] [address] [facility] [address]  [facility] [address]  [phone] [address]  [facility] [address]  [phone]    Referral Start Date:  03/19/2025  Referral End Date:   03/19/2026             SCHEDULING  If you do not already have an appointment, please call [phone] to make an appointment.     MORE INFORMATION  If you do not already have a [employer] account, sign up at: [email or web address]  You can access your medical information, make appointments, see lab results, billing information, and more.  If you have questions regarding this referral, please contact  the [facility] Referrals department at:             [phone]. Monday - Friday 8:00AM - 5:00PM.     Sincerely,    [facility]

## 2025-03-26 ENCOUNTER — RESULTS FOLLOW-UP (OUTPATIENT)
Dept: MEDICAL GROUP | Facility: PHYSICIAN GROUP | Age: 45
End: 2025-03-26

## 2025-07-07 DIAGNOSIS — M10.9 ACUTE GOUT OF FOOT, UNSPECIFIED CAUSE, UNSPECIFIED LATERALITY: ICD-10-CM

## 2025-07-07 RX ORDER — COLCHICINE 0.6 MG/1
TABLET ORAL
Qty: 3 TABLET | Refills: 3 | Status: SHIPPED | OUTPATIENT
Start: 2025-07-07 | End: 2025-07-12 | Stop reason: SDUPTHER

## 2025-07-12 ENCOUNTER — OFFICE VISIT (OUTPATIENT)
Dept: URGENT CARE | Facility: CLINIC | Age: 45
End: 2025-07-12
Payer: COMMERCIAL

## 2025-07-12 ENCOUNTER — PHARMACY VISIT (OUTPATIENT)
Dept: PHARMACY | Facility: MEDICAL CENTER | Age: 45
End: 2025-07-12
Payer: COMMERCIAL

## 2025-07-12 VITALS
TEMPERATURE: 98.1 F | HEIGHT: 68 IN | OXYGEN SATURATION: 97 % | SYSTOLIC BLOOD PRESSURE: 136 MMHG | RESPIRATION RATE: 18 BRPM | HEART RATE: 104 BPM | BODY MASS INDEX: 29.55 KG/M2 | DIASTOLIC BLOOD PRESSURE: 80 MMHG | WEIGHT: 195 LBS

## 2025-07-12 DIAGNOSIS — M10.9 ACUTE GOUT OF FOOT, UNSPECIFIED CAUSE, UNSPECIFIED LATERALITY: ICD-10-CM

## 2025-07-12 PROBLEM — E55.9 VITAMIN D DEFICIENCY: Status: ACTIVE | Noted: 2020-12-29

## 2025-07-12 PROCEDURE — RXMED WILLOW AMBULATORY MEDICATION CHARGE

## 2025-07-12 PROCEDURE — 99214 OFFICE O/P EST MOD 30 MIN: CPT

## 2025-07-12 RX ORDER — KETOROLAC TROMETHAMINE 15 MG/ML
15 INJECTION, SOLUTION INTRAMUSCULAR; INTRAVENOUS ONCE
Status: DISCONTINUED | OUTPATIENT
Start: 2025-07-12 | End: 2025-07-12

## 2025-07-12 RX ORDER — ALLOPURINOL 100 MG/1
100 TABLET ORAL DAILY
Qty: 30 TABLET | Refills: 0 | Status: SHIPPED | OUTPATIENT
Start: 2025-07-12

## 2025-07-12 RX ORDER — METHYLPREDNISOLONE 4 MG/1
TABLET ORAL
Qty: 21 TABLET | Refills: 0 | Status: SHIPPED | OUTPATIENT
Start: 2025-07-12 | End: 2025-07-21

## 2025-07-12 RX ORDER — COLCHICINE 0.6 MG/1
TABLET ORAL
Qty: 3 TABLET | Refills: 3 | Status: SHIPPED | OUTPATIENT
Start: 2025-07-12 | End: 2025-07-12

## 2025-07-12 RX ORDER — IBUPROFEN 800 MG/1
800 TABLET, FILM COATED ORAL EVERY 8 HOURS PRN
Qty: 30 TABLET | Refills: 0 | Status: SHIPPED | OUTPATIENT
Start: 2025-07-12 | End: 2025-07-21

## 2025-07-12 RX ORDER — COLCHICINE 0.6 MG/1
TABLET ORAL
Qty: 3 TABLET | Refills: 3 | Status: SHIPPED | OUTPATIENT
Start: 2025-07-12

## 2025-07-12 RX ORDER — KETOROLAC TROMETHAMINE 15 MG/ML
15 INJECTION, SOLUTION INTRAMUSCULAR; INTRAVENOUS ONCE
Status: COMPLETED | OUTPATIENT
Start: 2025-07-12 | End: 2025-07-12

## 2025-07-12 RX ADMIN — KETOROLAC TROMETHAMINE 15 MG: 15 INJECTION, SOLUTION INTRAMUSCULAR; INTRAVENOUS at 20:58

## 2025-07-12 ASSESSMENT — FIBROSIS 4 INDEX: FIB4 SCORE: 1.557377049180327869

## 2025-07-17 NOTE — PROGRESS NOTES
"Subjective:   Harshad Chester is a 44 y.o. male who presents for Leg Pain (Possible Gout flare up on left leg, on and off flare ups. )      HPI:    Patient presents to urgent care with concerns of gout flare  He reports left great toe and foot pain  Intermittent issues for a few weeks. Poor response to colchicine, dietary changes.  Has tried ice packs, ibuprofen  Reports indomethacin worked well the last time he had flare  He has PCP appointment next week.  Endorses history of gout and would like to start allopurinol  Has not taken allopurinol recently     ROS As above in HPI    Medications:    Medications Ordered Prior to Encounter[1]     Allergies:   Other food    Problem List:   Problem List[2]     Surgical History:  Past Surgical History:   Procedure Laterality Date    RETINAL DETACHMENT REPAIR Left        Past Social Hx:   Social History[3]       Problem list, medications, and allergies reviewed by myself today in Epic.     Objective:     /80   Pulse (!) 104   Temp 36.7 °C (98.1 °F) (Temporal)   Resp 18   Ht 1.727 m (5' 8\")   Wt 88.5 kg (195 lb)   SpO2 97%   BMI 29.65 kg/m²     Physical Exam  Vitals and nursing note reviewed.   Constitutional:       Appearance: Normal appearance.   Cardiovascular:      Rate and Rhythm: Normal rate and regular rhythm.      Heart sounds: Normal heart sounds.   Pulmonary:      Effort: Pulmonary effort is normal.      Breath sounds: Normal breath sounds.   Abdominal:      General: Bowel sounds are normal.      Palpations: Abdomen is soft.   Musculoskeletal:         General: Tenderness and signs of injury present. No swelling or deformity.      Left foot: Normal range of motion and normal capillary refill. Swelling, tenderness and bony tenderness (first MTP joint and midfoot) present. No deformity. Normal pulse.      Comments: First MTP joint and midfoot have erythema and warmth present   Skin:     Capillary Refill: Capillary refill takes less than 2 seconds.      " Findings: No erythema.   Neurological:      Mental Status: He is alert and oriented to person, place, and time.      Gait: Gait abnormal.         Assessment/Plan:       Diagnosis and associated orders:   1. Acute gout of foot, unspecified cause, unspecified laterality  - colchicine (COLCRYS) 0.6 MG Tab; Take two tablets by mouth as needed for gout flare, then take one tablet one hour later  Dispense: 3 Tablet; Refill: 3  - methylPREDNISolone (MEDROL DOSEPAK) 4 MG Tablet Therapy Pack; Follow schedule on package instructions.  Dispense: 21 Tablet; Refill: 0  - ibuprofen (MOTRIN) 800 MG Tab; Take 1 Tablet by mouth every 8 hours as needed for Inflammation or Moderate Pain.  Dispense: 30 Tablet; Refill: 0  - allopurinol (ZYLOPRIM) 100 MG Tab; Take 1 Tablet by mouth every day.  Dispense: 30 Tablet; Refill: 0  - ketorolac (Toradol) 15 MG/ML injection 15 mg       Comments/MDM:     Likely experiencing an acute gout flare and inadequate use of anti-inflammatories.   Toradol administered in office, patient to resume use of NSAIDs tomorrow.   Will refill colchicine and prednisone. Will resume use of allopurinol.   Encouraged rest, elevation, application of ice packs.  Follow up with PCP advised.       Return to clinic or go to ED if symptoms worsen or persist. Indications for ED discussed at length. Patient/Parent/Guardian voices understanding. Follow-up with your primary care provider in 3-5 days. Red flag symptoms discussed. All side effects of medication discussed including allergic response, GI upset, tendon injury, rash, sedation etc.    Please note that this dictation was created using voice recognition software. I have made a reasonable attempt to correct obvious errors, but I expect that there are errors of grammar and possibly content that I did not discover before finalizing the note.    This note was electronically signed by LINDSAY Harley         [1]   Current Outpatient Medications on File Prior to  Visit   Medication Sig Dispense Refill    losartan (COZAAR) 25 MG Tab Take 1 Tablet by mouth every day. (Patient not taking: Reported on 7/12/2025) 100 Tablet 3     No current facility-administered medications on file prior to visit.   [2]   Patient Active Problem List  Diagnosis    Acute gout involving toe of right foot    Vitamin D deficiency   [3]   Social History  Tobacco Use    Smoking status: Never    Smokeless tobacco: Never   Vaping Use    Vaping status: Never Used   Substance Use Topics    Alcohol use: Yes     Comment: occ    Drug use: Never

## 2025-07-21 ENCOUNTER — OFFICE VISIT (OUTPATIENT)
Dept: MEDICAL GROUP | Facility: MEDICAL CENTER | Age: 45
End: 2025-07-21
Payer: COMMERCIAL

## 2025-07-21 VITALS
DIASTOLIC BLOOD PRESSURE: 70 MMHG | OXYGEN SATURATION: 95 % | HEIGHT: 67 IN | BODY MASS INDEX: 29.76 KG/M2 | WEIGHT: 189.6 LBS | SYSTOLIC BLOOD PRESSURE: 118 MMHG | HEART RATE: 84 BPM | TEMPERATURE: 98.1 F

## 2025-07-21 DIAGNOSIS — T78.3XXS ANGIOEDEMA, SEQUELA: ICD-10-CM

## 2025-07-21 DIAGNOSIS — Z12.12 SCREENING FOR COLORECTAL CANCER: ICD-10-CM

## 2025-07-21 DIAGNOSIS — M10.9 GOUT INVOLVING TOE, UNSPECIFIED CAUSE, UNSPECIFIED CHRONICITY, UNSPECIFIED LATERALITY: Primary | ICD-10-CM

## 2025-07-21 DIAGNOSIS — I10 PRIMARY HYPERTENSION: ICD-10-CM

## 2025-07-21 DIAGNOSIS — Z12.11 SCREENING FOR COLORECTAL CANCER: ICD-10-CM

## 2025-07-21 PROBLEM — T78.3XXA ANGIO-EDEMA: Status: ACTIVE | Noted: 2025-07-21

## 2025-07-21 PROCEDURE — 3074F SYST BP LT 130 MM HG: CPT | Performed by: STUDENT IN AN ORGANIZED HEALTH CARE EDUCATION/TRAINING PROGRAM

## 2025-07-21 PROCEDURE — 99214 OFFICE O/P EST MOD 30 MIN: CPT | Performed by: STUDENT IN AN ORGANIZED HEALTH CARE EDUCATION/TRAINING PROGRAM

## 2025-07-21 PROCEDURE — 3078F DIAST BP <80 MM HG: CPT | Performed by: STUDENT IN AN ORGANIZED HEALTH CARE EDUCATION/TRAINING PROGRAM

## 2025-07-21 RX ORDER — EPINEPHRINE 0.3 MG/.3ML
INJECTION SUBCUTANEOUS
Qty: 1 EACH | Refills: 0 | Status: SHIPPED | OUTPATIENT
Start: 2025-07-21

## 2025-07-21 RX ORDER — LOSARTAN POTASSIUM 25 MG/1
25 TABLET ORAL DAILY
Qty: 100 TABLET | Refills: 3 | Status: SHIPPED | OUTPATIENT
Start: 2025-07-21 | End: 2026-08-25

## 2025-07-21 ASSESSMENT — FIBROSIS 4 INDEX: FIB4 SCORE: 1.557377049180327869

## 2025-07-22 NOTE — PROGRESS NOTES
Verbal consent was acquired by the patient to use [employer] ambient listening note generation during this visit     Subjective:     HPI:   History of Present Illness  The patient presents for evaluation of gout, hypertension, and allergies.    He experienced a gout flare-up on 07/05/2025, which was managed with colchicine and pain medication, providing relief for 3 to 4 days. However, the symptoms recurred last Saturday, prompting a visit to urgent care where he was prescribed a 6-day course of steroids. Currently, he reports no pain or inflammation. The urgent care physician suggested daily allopurinol once the symptoms subside. This is his first gout episode in over a year, with the previous one occurring in April 2024. He has been managing his condition through dietary changes, including reducing meat and alcohol consumption. He suspects that recent heavy drinking and unknown food intake at a party may have triggered this episode. His uric acid levels have not been checked in the past 2 years, but he does not recall them being significantly high. He has not yet started allopurinol and is seeking advice on its use. He has some ibuprofen 800 mg tablets left from a previous prescription. He does not currently have a primary care physician. He has had gout attacks in both toes and one finger joint.    He is currently taking losartan daily for hypertension, which has effectively controlled his blood pressure. A few months ago, his blood pressure readings were around 150/100. He is requesting refills of his losartan prescription.    He reports experiencing allergic reactions, typically at night, characterized by swelling of one lip that spreads around the mouth and sometimes causes jaw stiffness. These symptoms are noticeable upon waking but usually resolve by the end of the day. He does not experience any breathing difficulties. The first episode occurred in 2019, with 2 to 4 episodes in 2024 and a couple more in  "2025. He was prescribed an EpiPen during a visit to the emergency room in [place] but has never used it. He has an appointment with an allergist scheduled for October 2025.      Social History:  Diet: He has reduced meat and alcohol consumption.  Alcohol: He consumes alcohol occasionally.    FAMILY HISTORY  He reports no family history of colon cancer.        Objective:     Exam:  /70 (BP Location: Left arm, Patient Position: Sitting, BP Cuff Size: Adult)   Pulse 84   Temp 36.7 °C (98.1 °F) (Temporal)   Ht 1.702 m (5' 7\")   Wt 86 kg (189 lb 9.5 oz)   SpO2 95%   BMI 29.69 kg/m²  Body mass index is 29.69 kg/m².    ROS as above  Physical Exam  Constitutional:       Appearance: Normal appearance.   Cardiovascular:      Rate and Rhythm: Normal rate and regular rhythm.      Pulses: Normal pulses.      Heart sounds: Normal heart sounds. No murmur heard.  Pulmonary:      Effort: Pulmonary effort is normal. No respiratory distress.      Breath sounds: Normal breath sounds. No wheezing.   Neurological:      General: No focal deficit present.      Mental Status: He is alert and oriented to person, place, and time.         Assessment & Plan:     Assessment & Plan      Problem List Items Addressed This Visit       Gout involving toe - Primary    - The patient's gout flare-up was managed with colchicine and a 6-day course of steroids, which resolved the symptoms.  - Discussed dietary control, avoiding alcohol, red meat, shellfish, and soft drinks.  - Colchicine and naproxen can be used as needed for inflammation.  - A uric acid level test will be ordered to be conducted in a few weeks.  The patient is okay to not start allopurinol at this time given he only had 1 attack in the last 1 and year.         Relevant Orders    URIC ACID    Hypertension    Continue losartan 25 mg daily         Relevant Medications    losartan (COZAAR) 25 MG Tab    EPINEPHrine (EPIPEN) 0.3 MG/0.3ML Solution Auto-injector solution for injection    " Angio-edema    - The patient experiences intermittent allergic reactions, including lip swelling  due to an unidentified allergen.  -The symptoms started in years before his losartan.  So does not look like it is related to ARB even though this is rare with ARB  - Symptoms typically resolve by the end of the day.  - An EpiPen will be provided for use in case of severe allergic reactions.  - He is advised to keep an appointment with an allergist in October for further evaluation.         Relevant Medications    EPINEPHrine (EPIPEN) 0.3 MG/0.3ML Solution Auto-injector solution for injection     Other Visit Diagnoses         Screening for colorectal cancer        Relevant Orders    Cologuard® colon cancer screening                Return if symptoms worsen or fail to improve.    Please note that this dictation was created using voice recognition software. I have made every reasonable attempt to correct obvious errors, but I expect that there are errors of grammar and possibly content that I did not discover before finalizing the note.

## 2025-07-22 NOTE — ASSESSMENT & PLAN NOTE
- The patient's gout flare-up was managed with colchicine and a 6-day course of steroids, which resolved the symptoms.  - Discussed dietary control, avoiding alcohol, red meat, shellfish, and soft drinks.  - Colchicine and naproxen can be used as needed for inflammation.  - A uric acid level test will be ordered to be conducted in a few weeks.  The patient is okay to not start allopurinol at this time given he only had 1 attack in the last 1 and year.

## 2025-07-22 NOTE — ASSESSMENT & PLAN NOTE
- The patient experiences intermittent allergic reactions, including lip swelling  due to an unidentified allergen.  -The symptoms started in years before his losartan.  So does not look like it is related to ARB even though this is rare with ARB  - Symptoms typically resolve by the end of the day.  - An EpiPen will be provided for use in case of severe allergic reactions.  - He is advised to keep an appointment with an allergist in October for further evaluation.

## 2025-08-18 ENCOUNTER — OFFICE VISIT (OUTPATIENT)
Dept: URGENT CARE | Facility: CLINIC | Age: 45
End: 2025-08-18
Payer: COMMERCIAL

## 2025-08-18 VITALS
BODY MASS INDEX: 29.55 KG/M2 | HEART RATE: 74 BPM | OXYGEN SATURATION: 97 % | RESPIRATION RATE: 16 BRPM | HEIGHT: 68 IN | DIASTOLIC BLOOD PRESSURE: 98 MMHG | TEMPERATURE: 97.3 F | WEIGHT: 195 LBS | SYSTOLIC BLOOD PRESSURE: 124 MMHG

## 2025-08-18 DIAGNOSIS — L03.313 CELLULITIS OF CHEST WALL: ICD-10-CM

## 2025-08-18 DIAGNOSIS — Z75.8 DOES NOT HAVE PRIMARY CARE PROVIDER: ICD-10-CM

## 2025-08-18 DIAGNOSIS — L91.0 KELOID SCAR: Primary | ICD-10-CM

## 2025-08-18 PROCEDURE — 3074F SYST BP LT 130 MM HG: CPT

## 2025-08-18 PROCEDURE — 99214 OFFICE O/P EST MOD 30 MIN: CPT

## 2025-08-18 PROCEDURE — 3080F DIAST BP >= 90 MM HG: CPT

## 2025-08-18 RX ORDER — SULFAMETHOXAZOLE AND TRIMETHOPRIM 800; 160 MG/1; MG/1
1 TABLET ORAL 2 TIMES DAILY
Qty: 10 TABLET | Refills: 0 | Status: SHIPPED | OUTPATIENT
Start: 2025-08-18 | End: 2025-08-23

## 2025-08-18 RX ORDER — MUPIROCIN 2 %
1 OINTMENT (GRAM) TOPICAL 2 TIMES DAILY
Qty: 22 G | Refills: 0 | Status: SHIPPED | OUTPATIENT
Start: 2025-08-18

## 2025-08-18 ASSESSMENT — ENCOUNTER SYMPTOMS
COUGH: 0
CHILLS: 0
SHORTNESS OF BREATH: 0
FEVER: 0

## 2025-08-18 ASSESSMENT — FIBROSIS 4 INDEX: FIB4 SCORE: 1.59
